# Patient Record
Sex: MALE | Race: WHITE | URBAN - METROPOLITAN AREA
[De-identification: names, ages, dates, MRNs, and addresses within clinical notes are randomized per-mention and may not be internally consistent; named-entity substitution may affect disease eponyms.]

---

## 2020-06-09 ENCOUNTER — OFFICE VISIT (OUTPATIENT)
Dept: ORTHOPEDIC SURGERY | Age: 20
End: 2020-06-09
Payer: COMMERCIAL

## 2020-06-09 VITALS — HEIGHT: 74 IN | WEIGHT: 200 LBS | BODY MASS INDEX: 25.67 KG/M2

## 2020-06-09 PROCEDURE — 99203 OFFICE O/P NEW LOW 30 MIN: CPT | Performed by: PHYSICIAN ASSISTANT

## 2020-06-11 NOTE — PROGRESS NOTES
Chief Complaint  Shoulder Injury (LEFT deltoid tuberosity pain after fall holding dog leash; hit posterior aspect of shoulder with arm in flexion; pain w/movements above 90)      History of Present Illness:  Ida Lozano is a 20 y.o. male who presents today for evaluation of left shoulder pain. Patient states that a few weeks ago he was walking his dog when he slipped and a water total falling backwards in the dog lunged forward creating a piston effect to his left arm. He states he had immediate pain but states that with resting over-the-counter medication and gradually improved. He states that he is recently started a new job working for a [employer] and has to do a lot of physical activity to include lifting canoes up over his head. He finds that this activity increases his pain. Patient is very active and does exercise on a regular basis and finds that abduction and overhead activity exacerbates his pain activities where he keeps his arm close to his body seems to reduce his pain. He does at times have mild weakness. He has taken occasional over-the-counter ibuprofen with some benefit. He's had no previous injuries to the left shoulder. Pain is mainly over the lateral aspect of the shoulder with range of motion patient is right-hand dominant. Medical History  Patient's medications, allergies, past medical, surgical, social and family histories were reviewed and updated as appropriate. Review of Systems  Relevant review of systems reviewed and available in the patient's chart    Vital Signs  There were no vitals filed for this visit. General Exam:   Constitutional: Patient is adequately groomed with no evidence of malnutrition  Mental Status: The patient is oriented to time, place and person. The patient's mood and affect are appropriate. Neurological: The patient has good coordination. There is no weakness or sensory deficit.     Shoulder Examination  Inspection:  There is no deformity,

## 2020-06-23 ENCOUNTER — OFFICE VISIT (OUTPATIENT)
Dept: ORTHOPEDIC SURGERY | Age: 20
End: 2020-06-23
Payer: COMMERCIAL

## 2020-06-23 VITALS — BODY MASS INDEX: 25.67 KG/M2 | WEIGHT: 200 LBS | HEIGHT: 74 IN

## 2020-06-23 PROCEDURE — 99203 OFFICE O/P NEW LOW 30 MIN: CPT | Performed by: ORTHOPAEDIC SURGERY

## 2020-06-23 RX ORDER — IBUPROFEN 200 MG
200 TABLET ORAL EVERY 6 HOURS PRN
COMMUNITY

## 2020-06-23 NOTE — PROGRESS NOTES
palpable and 2+. Neurological: The patient has good coordination. There is no weakness or sensory deficit. Shoulder Examination:    Inspection: On inspection he has no obvious atrophy or superficial changes, reasonably good scapular control    Palpation: He has no real focal tenderness perhaps very slightly along the anterior joint line    Range of Motion: Full glenohumeral arc of movement    Strength: Good isometric rotator cuff strength. Special Tests: He does have a very reproducible pain with Jobes, O'Briens and Whipple's maneuver. No glenohumeral instability    Skin: There are no rashes, ulcerations or lesions. Gait: No instability    Spine good cervical rotation    Additional Comments:         Radiology:     X-rays from after Hours demonstrate normal osseous alignment of the glenohumeral articulation      Assessment : Clinical concern for a superior labral tear      Office Procedures:  Orders Placed This Encounter   Procedures    Ambulatory referral to Physical Therapy     Referral Priority:   Routine     Referral Type:   Eval and Treat     Referral Reason:   Specialty Services Required     Number of Visits Requested:   1       Treatment Plan: We discussed this provisional diagnosis. First-line recommendation for conservative treatment. He is using as needed anti-inflammatories. I like him to have a few visits of therapy to learn a glenohumeral stabilization program and scapular strengthening. Katy Cevallos observe this over the next 5 to 6 weeks. If not improving he is can call to arrange an [address] [facility]     This dictation was performed with a verbal recognition program (DRAGON) and it was checked for errors. It is possible that there are still dictated errors within this office note. If so, please bring any errors to my attention for an addendum.  All efforts were made to ensure that this office note is

## 2020-06-25 ENCOUNTER — HOSPITAL ENCOUNTER (OUTPATIENT)
Dept: PHYSICAL THERAPY | Age: 20
Setting detail: THERAPIES SERIES
Discharge: HOME | End: 2020-06-25
Payer: COMMERCIAL

## 2020-06-25 PROCEDURE — 97161 PT EVAL LOW COMPLEX 20 MIN: CPT | Performed by: PHYSICAL THERAPIST

## 2020-06-25 PROCEDURE — 97140 MANUAL THERAPY 1/> REGIONS: CPT | Performed by: PHYSICAL THERAPIST

## 2020-06-25 PROCEDURE — 97110 THERAPEUTIC EXERCISES: CPT | Performed by: PHYSICAL THERAPIST

## 2020-06-25 NOTE — PLAN OF CARE
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, attends     Living Status/Prior Level of Function: Independent with ADLs and IADLs, running daily, workout at gym/lifting, hiking, golf      OBJECTIVE:      Initial Initial Current   VAS 7/10     QDash 34%     ROM Left Right    Shoulder Flex 126 166    Shoulder Abd 109 165    Shoulder ER T 4 T 5    Shoulder IR T 11 T 5    Strength  Left Right    Shoulder Flex 4 5    Shoulder Scap 4 5    Shoulder ER 4+ 5    Shoulder IR 5* 5    THORACIC ROM      flexion WNL WNL    ext WNL WNL R/L   Rotation R/L WNL WNL R/L   SB R/L WNL WNL      CERV ROM Left Right    Cervical Flexion WNL WNL    Cervical Extension WNL WNL    Cervical SB R/L WNL WNL R/L   Cervical rotation R/L WNL WNL R/L     Reflexes/Sensation:    [x]Dermatomes/Myotomes intact    [x]Reflexes equal and normal bilaterally   []Other:    Joint mobility:    []Normal    [x]Hypo   []Hyper    Palpation:L UT, ant deltoid, Pec minor,     Functional Mobility/Transfers: independent    Posture: thoracic kyphosis    Bandages/Dressings/Incisions: none    Gait: (include devices/WB status) WNL    Orthopedic Special Tests:   Neer's (+)   Hawkin's Jeet (-)   Sulcus Sign (+)   Load and Shift (+)   Anterior Apprehension/Relocation    Dynamic Labral Shear    Empty Can (+)   Lift Off (+)   Belly Press (+)                        [x] Patient history, allergies, meds reviewed. Medical chart reviewed. See intake form. Review Of Systems (ROS):  [x]Performed Review of systems (Integumentary, CardioPulmonary, Neurological) by intake and observation. Intake form has been scanned into medical record.  Patient has been instructed to contact their primary care physician regarding ROS issues if not already being addressed at this time. Co-morbidities/Complexities (which will affect course of rehabilitation):   [x]None           Arthritic conditions   []Rheumatoid arthritis (M05.9)  []Osteoarthritis (M19.91)   Cardiovascular conditions   []Hypertension (I10)  []Hyperlipidemia (E78.5)  []Angina pectoris (I20)  []Atherosclerosis (I70)   Musculoskeletal conditions   []Disc pathology   []Congenital spine pathologies   []Prior surgical intervention  []Osteoporosis (M81.8)  []Osteopenia (M85.8)   Endocrine conditions   []Hypothyroid (E03.9)  []Hyperthyroid Gastrointestinal conditions   []Constipation (L19.68)   Metabolic conditions   []Morbid obesity (E66.01)  []Diabetes type 1(E10.65) or 2 (E11.65)   []Neuropathy (G60.9)     Pulmonary conditions   []Asthma (J45)  []Coughing   []COPD (J44.9)   Psychological Disorders  []Anxiety (F41.9)  []Depression (F32.9)   []Other:   []Other:          Barriers to/and or personal factors that will affect rehab potential:              []Age  []Sex              []Motivation/Lack of Motivation                        []Co-Morbidities              []Cognitive Function, education/learning barriers              []Environmental, home barriers              []profession/work barriers  []past PT/medical experience  []other:  Justification: Pt. Without co-morbidities and good tolerance to initial treatment    Falls Risk Assessment (30 days):   [x] Falls Risk assessed and no intervention required. [] Falls Risk assessed and Patient requires intervention due to being higher risk   TUG score (>12s at risk):     [] Falls education provided.       G-Codes:       ASSESSMENT:    Functional Impairments   [x]Noted spinal or UE joint hypomobility   []Noted spinal or UE joint hypermobility   [x]Decreased UE functional ROM   [x]Decreased UE functional strength   []Abnormal reflexes/sensation/myotomal/dermatomal deficits   [x]Decreased RC/scapular/core strength and neuromuscular control   []other:      Functional Activity Limitations (from functional questionnaire and intake)   []Reduced ability to tolerate prolonged functional positions   []Reduced ability or difficulty with changes of positions or transfers between positions   [x]Reduced ability to maintain good posture and demonstrate good body mechanics with sitting, bending, and lifting   [] Reduced ability or tolerance with driving and/or computer work   []Reduced ability to sleep   [x]Reduced ability to perform lifting, reaching, carrying tasks   [x]Reduced ability to tolerate impact through UE   [x]Reduced ability to reach behind back   []Reduced ability to  or hold objects   [x]Reduced ability to throw or toss an object   []other:    Participation Restrictions   []Reduced participation in self care activities   []Reduced participation in home management activities   [x]Reduced participation in work activities   []Reduced participation in social activities. [x]Reduced participation in sport/recreation activities. Classification :   []signs/symptoms consistent with post-surgical status including decreased ROM, strength and function.   []Signs/symptoms consistent with joint sprain/strain   [x]Signs/symptoms consistent with shoulder impingement   []Signs/symptoms consistent with shoulder/elbow tendinopathy   []Signs/symptoms consistent with Rotator cuff tear   []Signs/symptoms consistent with labral tear   []Signs/symptoms consistent with postural dysfunction    []signs/symptoms consistent with Glenohumeral Internal Rotation Deficit - <45 degrees   []signs/symptoms consistent with facet dysfunction of cervical/cervico-thoracic or thoracicspine    []signs/symptoms consistent with pathology which may benefit from Dry needling     [x]other:  L shoulder instability    Prognosis/Rehab Potential:      []Excellent   [x]Good    [x]Fair   []Poor    Tolerance of evaluation/treatment: []Excellent   [x]Good    []Fair   []Poor    Physical Therapy Evaluation Complexity Justification  [x] A history of present problem with:  [] no personal factors and/or comorbidities that impact the plan of care;  [x]1-2 personal factors and/or comorbidities that impact the plan of care  []3 personal factors and/or comorbidities that impact the plan of care  [x] An examination of body systems using standardized tests and measures addressing any of the following: body structures and functions (impairments), activity limitations, and/or participation restrictions;:  [] a total of 1-2 or more elements   [x] a total of 3 or more elements   [] a total of 4 or more elements   [x] A clinical presentation with:  [x] stable and/or uncomplicated characteristics   [] evolving clinical presentation with changing characteristics  [] unstable and unpredictable characteristics;   [x] Clinical decision making of [x] low, [] moderate, [] high complexity using standardized patient assessment instrument and/or measurable assessment of functional outcome. [x] EVAL (LOW) 65075 (typically 20 minutes face-to-face)  [] EVAL (MOD) 37969 (typically 30 minutes face-to-face)  [] EVAL (HIGH) 00609 (typically 45 minutes face-to-face)  [] RE-EVAL     PLAN:  Frequency/Duration:  2 days per week for 8 Weeks:  INTERVENTIONS:  [x] Therapeutic exercise including: strength training, ROM, for Upper extremity and core   [x]  NMR activation and proprioception for UE, scap and Core   [x] Manual therapy as indicated for shoulder, scapula and spine to include: Dry Needling/IASTM, STM, PROM, Gr I-IV mobilizations, manipulation. [x] Modalities as needed that may include: thermal agents, E-stim, Biofeedback, US, iontophoresis as indicated  [x] Patient education on joint protection, postural re-education, activity modification, progression of HEP. HEP instruction: Discussed HEP and patient given handout.     GOALS:  Patient stated goal: restore ROM and

## 2020-06-25 NOTE — FLOWSHEET NOTE
NMR re-education                                                 Therapeutic Exercise and NMR EXR  [x] (06587) Provided verbal/tactile cueing for activities related to strengthening, flexibility, endurance, ROM  for improvements in scapular, scapulothoracic and UE control with self care, reaching, carrying, lifting, house/yardwork, driving/computer work. [x] (37945) Provided verbal/tactile cueing for activities related to improving balance, coordination, kinesthetic sense, posture, motor skill, proprioception  to assist with  scapular, scapulothoracic and UE control with self care, reaching, carrying, lifting, house/yardwork, driving/computer work. Therapeutic Activities:    [] (26589 or 51706) Provided verbal/tactile cueing for activities related to improving balance, coordination, kinesthetic sense, posture, motor skill, proprioception and motor activation to allow for proper function of scapular, scapulothoracic and UE control with self care, carrying, lifting, driving/computer work.      Home Exercise Program:    [x] (22981) Reviewed/Progressed HEP activities related to strengthening, flexibility, endurance, ROM of scapular, scapulothoracic and UE control with self care, reaching, carrying, lifting, house/yardwork, driving/computer work  [] (25781) Reviewed/Progressed HEP activities related to improving balance, coordination, kinesthetic sense, posture, motor skill, proprioception of scapular, scapulothoracic and UE control with self care, reaching, carrying, lifting, house/yardwork, driving/computer work      Manual Treatments:  PROM / STM / Oscillations-Mobs:  G-I, II, III, IV (PA's, Inf., Post.)  [x] (92138) Provided manual therapy to mobilize soft tissue/joints of cervical/CT, scapular GHJ and UE for the purpose of modulating pain, promoting relaxation,  increasing ROM, reducing/eliminating soft tissue swelling/inflammation/restriction, improving soft tissue extensibility and allowing for proper ROM

## 2020-07-02 ENCOUNTER — HOSPITAL ENCOUNTER (OUTPATIENT)
Dept: PHYSICAL THERAPY | Age: 20
Setting detail: THERAPIES SERIES
Discharge: HOME | End: 2020-07-02
Payer: COMMERCIAL

## 2020-07-02 NOTE — FLOWSHEET NOTE
[facility], [address]        Physical Therapy  Cancellation/No-show Note  Patient Name:  Schuyler Bran  :  2000   Date:  2020  Cancelled visits to date: 1  No-shows to date: 0    For today's appointment patient:  [x]  Cancelled  []  Rescheduled appointment  []  No-show     Reason given by patient:  []  Patient ill  []  Conflicting appointment  []  No transportation    []  Conflict with work  []  No reason given  [x]  Other: awaiting ins.  approval    Comments:      Electronically signed by:  Lisbet Herrera, PT, MSPT, OMT-C [id number]

## 2020-07-07 ENCOUNTER — HOSPITAL ENCOUNTER (OUTPATIENT)
Dept: PHYSICAL THERAPY | Age: 20
Setting detail: THERAPIES SERIES
Discharge: HOME | End: 2020-07-07
Payer: COMMERCIAL

## 2020-07-16 ENCOUNTER — HOSPITAL ENCOUNTER (OUTPATIENT)
Dept: PHYSICAL THERAPY | Age: 20
Setting detail: THERAPIES SERIES
Discharge: HOME | End: 2020-07-16
Payer: COMMERCIAL

## 2020-07-21 ENCOUNTER — HOSPITAL ENCOUNTER (OUTPATIENT)
Dept: PHYSICAL THERAPY | Age: 20
Setting detail: THERAPIES SERIES
Discharge: HOME | End: 2020-07-21
Payer: COMMERCIAL

## 2020-07-21 NOTE — FLOWSHEET NOTE
[facility], [address]        Physical Therapy  Cancellation/No-show Note  Patient Name:  Delmy Santo  :  2000   Date:  2020  Cancelled visits to date: 1  No-shows to date: 3    For today's appointment patient:  []  Cancelled  []  Rescheduled appointment  [x]  No-show     Reason given by patient:  []  Patient ill  []  Conflicting appointment  []  No transportation    []  Conflict with work  [x]  No reason given  []  Other: awaiting ins.  approval    Comments:      Electronically signed by:  Flores Love, PT, MSPT, OMT-C [id number]

## 2020-08-25 ENCOUNTER — HOSPITAL ENCOUNTER (OUTPATIENT)
Dept: PHYSICAL THERAPY | Age: 20
Setting detail: THERAPIES SERIES
Discharge: HOME | End: 2020-08-25
Payer: COMMERCIAL

## 2020-08-25 NOTE — FLOWSHEET NOTE
[facility], [address]        Physical Therapy  Cancellation/No-show Note  Patient Name:  Shun Matos  :  2000   Date:  2020  Cancelled visits to date: 2  No-shows to date: 3    For today's appointment patient:  [x]  Cancelled  []  Rescheduled appointment  []  No-show     Reason given by patient:  []  Patient ill  []  Conflicting appointment  []  No transportation    []  Conflict with work  [x]  No reason given  []  Other: awaiting ins.  approval    Comments:      Electronically signed by:  Melissa Ash, PT, MSPT, OMT-C [id number]

## 2020-08-27 ENCOUNTER — HOSPITAL ENCOUNTER (OUTPATIENT)
Dept: PHYSICAL THERAPY | Age: 20
Setting detail: THERAPIES SERIES
Discharge: HOME | End: 2020-08-27
Payer: COMMERCIAL

## 2020-08-27 PROCEDURE — 97110 THERAPEUTIC EXERCISES: CPT | Performed by: PHYSICAL THERAPIST

## 2020-08-27 PROCEDURE — 97164 PT RE-EVAL EST PLAN CARE: CPT | Performed by: PHYSICAL THERAPIST

## 2020-08-27 PROCEDURE — 97112 NEUROMUSCULAR REEDUCATION: CPT | Performed by: PHYSICAL THERAPIST

## 2020-08-27 NOTE — PLAN OF CARE
[facility], [address]  Phone: [phone]  Fax [phone]    Physical Therapy Daily Treatment Note  Date:  2020    Patient Name:  Lis Herrera    :  2000  MRN: [id number]  Restrictions/Precautions:    Physician Information:  Referring Practitioner: Zeinab Zeng MD  Medical/Treatment Diagnosis Information:  · Diagnosis: M75.42 (ICD-10-CM) - Impingement syndrome of left shoulder, S46.912A (ICD-10-CM) - Shoulder strain, left, initial encounter  Treatment Diagnosis:Left shoulder pain M25.512  ·   [x] Conservative / [] Surgical - DOS:  Insurance/Certification information:  PT Insurance Information: Lukaszmarilumariaelena Shay new insurance as of August  Number of Comorbidities:  [x]0     []1-2    []3+          Latex Allergy:  [x]NO      []YES  Preferred Language for Healthcare:   [x]English       []other:  Has the plan of care been signed (Y/N):        []  Yes  [x]  No       Is this a Progress Report:     [x]  Yes  []  No      If Yes:  Date Range for reporting period:  Beginning 2020  Ending 20    Progress report will be due (10 Rx or 30 days whichever is less):        Recertification will be due (POC Duration  / 90 days whichever is less): 8 weeks       Visit # Insurance Allowable Auth Required   2 ? []  Yes []  No        SUBJECTIVE:  Pt. Returns after initial eval on 20 without follow up for re-evaluation of L shoulder, States that his father lost his job and he was without insurance until now, has switched to [employer]. He will need to limit PT visits due to financial limitations. The arm seems ok at rest for the most part, reaching or lifting overhead will provoke the pain, reaching behind is the worst, reaching out to the side is also limited. The pain is in the upper lateral arm (deltoid).     OBJECTIVE:     Initial Initial Current   VAS 7/10   3/10    QDash 34%   25%    ROM Left Right     Shoulder Flex 126 166 150    Shoulder Abd 109 165  115   Shoulder ER T 4 T 5  T6   Shoulder IR T 11 T 5  T9   Strength  Left Right     Shoulder Flex 4 5  4+   Shoulder Scap 4 5 4+    Shoulder ER 4+ 5  5-   Shoulder IR 5* 5 5 -           Observation:(+)NEER's, (+)speed's (-)hahn-Jeet, (+)Yerguson's, (+)belly press, (+)lift off (+)sulcus sign (-)load and shift   Palpation: lat deltoid, LHB tendon, IS, Pec minor       RESTRICTIONS/PRECAUTIONS:     Exercises/Interventions:   Therapeutic Ex Sets/reps Notes   tableslide flexion, ladder, 45's x10  ea With focus on scap movement   SBS Reviewed correct form    UT/LEV stretch          TB rows, ext L X 10 ea Blue band   TB ER IR (with towel roll) 2x10 ea  mod cues for scapular position Blue band   Corner stretch HEP Per doc   Pt ed:  anatomy, PT progression, RICE 8 min              Supine ceiling punch Reviewed for HEP Add weight as able for HEP +supine approximation   Prone scap retraction     SL scap retraction with ER               scap clocks at wall with TB x10 OVL   Ball on wall with u/d,s/s,cw/ccw x10 ea Cues for scap position             Manual Intervention     Gr I-II post/inf GHJ mobs     PROM L shoulder          Ed for CFM pec mnor/IS 3'    GHJ gr I-II oscillations 3'              NMR re-education                                                 Therapeutic Exercise and NMR EXR  [x] (35818) Provided verbal/tactile cueing for activities related to strengthening, flexibility, endurance, ROM  for improvements in scapular, scapulothoracic and UE control with self care, reaching, carrying, lifting, house/yardwork, driving/computer work. [x] (34568) Provided verbal/tactile cueing for activities related to improving balance, coordination, kinesthetic sense, posture, motor skill, proprioception  to assist with  scapular, scapulothoracic and UE control with self care, reaching, carrying, lifting, house/yardwork, driving/computer work.     Therapeutic Activities:    [] (05265 or 12801) Provided verbal/tactile cueing for activities related to improving balance, coordination, kinesthetic sense, posture, motor skill, proprioception and motor activation to allow for proper function of scapular, scapulothoracic and UE control with self care, carrying, lifting, driving/computer work.      Home Exercise Program:    [x] (78534) Reviewed/Progressed HEP activities related to strengthening, flexibility, endurance, ROM of scapular, scapulothoracic and UE control with self care, reaching, carrying, lifting, house/yardwork, driving/computer work  [] (87689) Reviewed/Progressed HEP activities related to improving balance, coordination, kinesthetic sense, posture, motor skill, proprioception of scapular, scapulothoracic and UE control with self care, reaching, carrying, lifting, house/yardwork, driving/computer work      Manual Treatments:  PROM / STM / Oscillations-Mobs:  G-I, II, III, IV (PA's, Inf., Post.)  [x] (29789) Provided manual therapy to mobilize soft tissue/joints of cervical/CT, scapular GHJ and UE for the purpose of modulating pain, promoting relaxation,  increasing ROM, reducing/eliminating soft tissue swelling/inflammation/restriction, improving soft tissue extensibility and allowing for proper ROM for normal function with self care, reaching, carrying, lifting, house/yardwork, driving/computer work    Modalities:      [] GR/ESU 15 min    [] GR 15 min   [] ESU     [] CP    [] MHP    [] declined    Charges:  Timed Code Treatment Minutes: 28   Total Treatment Minutes: 48     [] EVAL (LOW) 05127 (typically 20 minutes face-to-face)  [] EVAL (MOD) 48224 (typically 30 minutes face-to-face)  [] EVAL (HIGH) 88105 (typically 45 minutes face-to-face)  [x] RE-EVAL     [x] UA(16436) x  1   [] IONTO  [x] NMR (99875) x 1    [] VASO  [] Manual (97937) x     [] Other:  [] TA x      [] Mech Traction (28609)  [] ES(attended) (48752)      [] ES (un) (71400):     GOALS:  Patient stated goal: restore ROM and strength L shoulder  [x] Progressing: [] Met: [] Not Met: [] Adjusted    Therapist goals for Patient:   Short Term Goals: To be achieved in: 2 weeks  1. Independent in HEP and progression per patient tolerance, in order to prevent re-injury. [x] Progressing: [] Met: [] Not Met: [] Adjusted  2. Patient will have a decrease in pain to facilitate improvement in movement, function, and ADLs as indicated by Functional Deficits. [x] Progressing: [] Met: [] Not Met: [] Adjusted      Long Term Goals: To be achieved in: 8 weeks  1. Disability index score of 17% or less for the Quickdash  to assist with reaching prior level of function. [x] Progressing: [] Met: [] Not Met: [] Adjusted  2. Patient will demonstrate increased AROM to WNL to allow for proper joint functioning as indicated by patients Functional Deficits. [x] Progressing: [] Met: [] Not Met: [] Adjusted  3. Patient will demonstrate an increase in Strength >/=5/5 to allow for proper functional mobility as indicated by patients Functional Deficits. [x] Progressing: [] Met: [] Not Met: [] Adjusted  4. Patient will return to reaching and lifting functional activities without increased symptoms or restriction. [] Progressing: [] Met: [x] Not Met: [] Adjusted  5. Pt to be able to return to work with lifting(patient specific functional goal)    [] Progressing: [] Met: [x] Not Met: [] Adjusted  6. Pt. To demonstrate AROM L shoulder with appropriate scapulo-humeral rhythm    [] Progressing: [] Met: [] Not Met: [] Adjusted    Overall Progression Towards Functional goals/ Treatment Progress Update:  [] Patient is progressing as expected towards functional goals listed. [x] Progression is slowed due to complexities/Impairments listed. [] Progression has been slowed due to co-morbidities.   [] Plan just implemented, too soon to assess goals progression <30days   [] Goals require adjustment due to lack of progress  [] Patient is not progressing as expected and requires additional follow up with physician  [] Other    Prognosis for POC: [x] Good [] Fair  [] Poor      Patient requires continued skilled intervention: [x] Yes  [] No      ASSESSMENT:  Pt. Returns for follow up from 6/25/20 eval with improving L shoulder ROM and strength, as well as improved disability score. He is still limited by pain with reaching and lifting, especially behind. He has significant scapular winging at rest and with ex. He did a good job of correcting scap stab and avoiding UT firing with cues. Also having some TrP pain in L pec minor and IS. Pt. Would benefit from skilled PT to progress through scap stab and functional ex. However, has a financial limitation and can come to PT on a limited basis. Treatment/Activity Tolerance:  [x] Patient tolerated treatment well [] Patient limited by fatique  [] Patient limited by pain  [] Patient limited by other medical complications  [] Other:     Prognosis: [x] Good [] Fair  [] Poor    Patient Requires Follow-up: [x] Yes  [] No    PLAN: Rec. PT 2x/week 8 weeks, however pt. Unable and will call to schedule next appt. In 2-3 weeks  [x] Continue per plan of care [] Alter current plan (see comments above)  [] Plan of care initiated [] Hold pending MD visit [] Discharge      Electronically signed by:  Scottie Ramirez, PT, MSPT, OMT-C [id number]    Note: If patient does not return for scheduled/ recommended follow up visits, this note will serve as a discharge from care along with most recent update on progress.

## 2020-10-20 ENCOUNTER — HOSPITAL ENCOUNTER (OUTPATIENT)
Dept: PHYSICAL THERAPY | Age: 20
Setting detail: THERAPIES SERIES
Discharge: HOME | End: 2020-10-20
Payer: COMMERCIAL

## 2020-10-20 PROCEDURE — 97112 NEUROMUSCULAR REEDUCATION: CPT | Performed by: PHYSICAL THERAPIST

## 2020-10-20 PROCEDURE — 97110 THERAPEUTIC EXERCISES: CPT | Performed by: PHYSICAL THERAPIST

## 2020-10-20 NOTE — PLAN OF CARE
[facility], [address] [place]  Phone: [phone]  Fax [phone]    Physical Therapy Daily Treatment Note  Date:  10/20/2020    Patient Name:  Korey Castanon    :  2000  MRN: [id number]  Restrictions/Precautions:    Physician Information:  Referring Practitioner: Makeda Champion MD  Medical/Treatment Diagnosis Information:  · Diagnosis: M75.42 (ICD-10-CM) - Impingement syndrome of left shoulder, S46.912A (ICD-10-CM) - Shoulder strain, left, initial encounter  Treatment Diagnosis:Left shoulder pain M25.512  ·   [x] Conservative / [] Surgical - DOS:  Insurance/Certification information:  PT Insurance Information: Michael Calloway new insurance as of August  Number of Comorbidities:  [x]0     []1-2    []3+          Latex Allergy:  [x]NO      []YES  Preferred Language for Healthcare:   [x]English       []other:  Has the plan of care been signed (Y/N):        []  Yes  [x]  No       Is this a Progress Report:     [x]  Yes  []  No      If Yes:  Date Range for reporting period:  Beginning  20  Ending 10/20/20    Progress report will be due (10 Rx or 30 days whichever is less): 15/26/90       Recertification will be due (POC Duration  / 90 days whichever is less):4 weeks       Visit # Insurance Allowable Auth Required   3 ? []  Yes []  No        SUBJECTIVE:  Pt. Returns after last visit 2 mos. Ago for update to program, as he is doing much better and needs progression.     OBJECTIVE:     Initial Initial Current   VAS 7/10   0-1/10    QDash 34%   9%    ROM Left Right     Shoulder Flex 126 166 168   Shoulder Abd 109 165 165   Shoulder ER T 4 T 5  T6   Shoulder IR T 11 T 5  T5   Strength  Left Right     Shoulder Flex 4 5 5   Shoulder Scap 4 5 5   Shoulder ER 4+ 5 5   Shoulder IR 5* 5 5           Observation:(-)NEER's, (-)speed's (-)hahn-Jeet, (-)belly press, (-)lift off (-)sulcus sign (-)load and shift   Palpation: RESTRICTIONS/PRECAUTIONS:     Exercises/Interventions:   Therapeutic Ex Sets/reps Notes   tableslide flexion, ladder, 45's  With focus on scap movement   SBS     UT/LEV stretch          TB rows, ext L  Blue band   TB ER IR (with towel roll)   mod cues for scapular position Blue band   Corner stretch HEP Per doc   Pt ed:   PT progression, 8 min              Supine ceiling punch  Add weight as able for HEP +supine approximation   Prone scap retraction     SL scap retraction with ER               scap clocks at wall with TB  OVL   Ball on wall with u/d,s/s,cw/ccw  Cues for scap position             Manual Intervention     Assessment of L shoulder 5'    Gr I-II post/inf GHJ mobs     PROM L shoulder          Ed for CFM pec mnor/IS     GHJ gr I-II oscillations               NMR re-education     Door way stretch :10x10    Supine approximation :60x2 ea ball up/ball down 2 kg db   Supine SA to lat arm bar x20 2 kg db        Prone plank :30x1 Cues for protraction   Table plank Ed for modification    Wall push up vs SB wall push up x5 ea Cues for SA press at end        CC row/ext/LPD/ER/IR x10 ea 10pl/8pl/8pl/3pl/3pl                           Therapeutic Exercise and NMR EXR  [x] (60221) Provided verbal/tactile cueing for activities related to strengthening, flexibility, endurance, ROM  for improvements in scapular, scapulothoracic and UE control with self care, reaching, carrying, lifting, house/yardwork, driving/computer work. [x] (90012) Provided verbal/tactile cueing for activities related to improving balance, coordination, kinesthetic sense, posture, motor skill, proprioception  to assist with  scapular, scapulothoracic and UE control with self care, reaching, carrying, lifting, house/yardwork, driving/computer work.     Therapeutic Activities:    [] (62658 or 74296) Provided verbal/tactile cueing for activities related to improving balance, coordination, kinesthetic sense, posture, motor skill, proprioception and motor activation to allow for proper function of scapular, scapulothoracic and UE control with self care, carrying, lifting, driving/computer work. Home Exercise Program:    [x] (58882) Reviewed/Progressed HEP activities related to strengthening, flexibility, endurance, ROM of scapular, scapulothoracic and UE control with self care, reaching, carrying, lifting, house/yardwork, driving/computer work  [] (50695) Reviewed/Progressed HEP activities related to improving balance, coordination, kinesthetic sense, posture, motor skill, proprioception of scapular, scapulothoracic and UE control with self care, reaching, carrying, lifting, house/yardwork, driving/computer work      Manual Treatments:  PROM / STM / Oscillations-Mobs:  G-I, II, III, IV (PA's, Inf., Post.)  [x] (26600) Provided manual therapy to mobilize soft tissue/joints of cervical/CT, scapular GHJ and UE for the purpose of modulating pain, promoting relaxation,  increasing ROM, reducing/eliminating soft tissue swelling/inflammation/restriction, improving soft tissue extensibility and allowing for proper ROM for normal function with self care, reaching, carrying, lifting, house/yardwork, driving/computer work    Modalities:      [] GR/ESU 15 min    [] GR 15 min   [] ESU     [] CP    [] MHP    [] declined    Charges:  Timed Code Treatment Minutes: 45   Total Treatment Minutes: 45     [] EVAL (LOW) 96809 (typically 20 minutes face-to-face)  [] EVAL (MOD) 77570 (typically 30 minutes face-to-face)  [] EVAL (HIGH) 68850 (typically 45 minutes face-to-face)  [] RE-EVAL     [x] ZY(62772) x  2   [] IONTO  [x] NMR (91669) x 1    [] VASO  [] Manual (63540) x     [] Other:  [] TA x      [] Mech Traction (45542)  [] ES(attended) (63983)      [] ES (un) (08192):     GOALS:  Patient stated goal: restore ROM and strength L shoulder  [] Progressing: [x] Met: [] Not Met: [] Adjusted    Therapist goals for Patient:   Short Term Goals: To be achieved in: 2 weeks  1. Independent in HEP and progression per patient tolerance, in order to prevent re-injury. [] Progressing: [x] Met: [] Not Met: [] Adjusted  2. Patient will have a decrease in pain to facilitate improvement in movement, function, and ADLs as indicated by Functional Deficits. [] Progressing: [x] Met: [] Not Met: [] Adjusted      Long Term Goals: To be achieved in: 8 weeks  1. Disability index score of 17% or less for the Quickdash  to assist with reaching prior level of function. [] Progressing: [x] Met: [] Not Met: [] Adjusted  2. Patient will demonstrate increased AROM to WNL to allow for proper joint functioning as indicated by patients Functional Deficits. [] Progressing: [x] Met: [] Not Met: [] Adjusted  3. Patient will demonstrate an increase in Strength >/=5/5 to allow for proper functional mobility as indicated by patients Functional Deficits. [] Progressing: [x] Met: [] Not Met: [] Adjusted  4. Patient will return to reaching and lifting functional activities without increased symptoms or restriction. [x] Progressing:has still been limiting activity [] Met: [] Not Met: [] Adjusted  5. Pt to be able to return to work with lifting(patient specific functional goal)    [x] Progressing: [] Met: [] Not Met: [] Adjusted  6. Pt. To demonstrate AROM L shoulder with appropriate scapulo-humeral rhythm    [x] Progressing: [] Met: [] Not Met: [] Adjusted    Overall Progression Towards Functional goals/ Treatment Progress Update:  [x] Patient is progressing as expected towards functional goals listed. [] Progression is slowed due to complexities/Impairments listed. [] Progression has been slowed due to co-morbidities.   [] Plan just implemented, too soon to assess goals progression <30days   [] Goals require adjustment due to lack of progress  [] Patient is not progressing as expected and requires additional follow up with physician  [] Other    Prognosis for POC: [x] Good [] Fair  [] Poor      Patient

## 2020-11-05 ENCOUNTER — HOSPITAL ENCOUNTER (OUTPATIENT)
Dept: PHYSICAL THERAPY | Age: 20
Setting detail: THERAPIES SERIES
Discharge: HOME | End: 2020-11-05
Payer: COMMERCIAL

## 2020-11-05 PROCEDURE — 97112 NEUROMUSCULAR REEDUCATION: CPT | Performed by: PHYSICAL THERAPIST

## 2020-11-05 PROCEDURE — 97110 THERAPEUTIC EXERCISES: CPT | Performed by: PHYSICAL THERAPIST

## 2020-11-05 NOTE — DISCHARGE SUMMARY
[facility],  [address]  Phone: [phone]  Fax [phone]       Physical Therapy Discharge  Date: 2020        Patient Name:  London Osgood    :  2000  MRN: [id number]  Referring Samuel Love MD  · Diagnosis:M75.42 (ICD-10-CM) - Impingement syndrome of left shoulder, T02.731G (ICD-10-CM) - Shoulder strain, left, initial encounter  Treatment Diagnosis:Left shoulder pain M25.512                    [] Surgical [x] Conservative   Therapy Diagnosis/Practice Pattern:C      Number of Comorbidities:  [x]0     []1-2    []3+  Total number of visits: 4   Reporting Period:   Beginning Date:20   End Date:20    Initial Initial Current   VAS 7/10   0/10    QDash 34%   9%    ROM Left Right     Shoulder Flex 126 166 168   Shoulder Abd 109 165 165   Shoulder ER T 4 T 5  T6   Shoulder IR T 11 T 5  T5   Strength  Left Right     Shoulder Flex 4 5 5   Shoulder Scap 4 5 5   Shoulder ER 4+ 5 5   Shoulder IR 5* 5 5          Treatment to date:  [x] Therapeutic Exercise    [] Modalities:  [] Therapeutic Activity             []Ultrasound            []Electrical Stimulation  [] Gait Training     []Cervical Traction    [] Lumbar Traction  [x] Neuromuscular Re-education [] Cold/hotpack         []Iontophoresis  [] Instruction in HEP      Other:  [x] Manual Therapy                   []                                  []   Assessment:   [x] All Goals were achieved.    [] The following goals were achieved (#'s):   [] The following goals were not achieved for the following reasons:/assessmen of improvement as it relates to each goal:    Plan of Care:  [x] Discharge from Therapy Services due to:    Reason for Discharge:   [x] All goals achieved    [] Patient having surgery  [] Physician discontinued therapy  [] Insurance/Financial Limitations [] Patient did not return for follow up visits [] Home program/1 visit only   [] No subjective or objective improvement [] Plateaued   [] Patient was unable to adhere to the plan of care established at evaluation. [] Referred back to physician for re-evaluation and did not return.      [] Other:       Electronically signed by:  Stone Garcia, PT, MSPT , OMT_C [id number]

## 2020-11-05 NOTE — PLAN OF CARE
[facility], [address]  Phone: [phone]  Fax [phone]    Physical Therapy Daily Treatment Note  Date:  2020    Patient Name:  Lizet Story    :  2000  MRN: [id number]  Restrictions/Precautions:    Physician Information:  Referring Practitioner: Santana Correia MD  Medical/Treatment Diagnosis Information:  · Diagnosis: M75.42 (ICD-10-CM) - Impingement syndrome of left shoulder, S46.912A (ICD-10-CM) - Shoulder strain, left, initial encounter  Treatment Diagnosis:Left shoulder pain M25.512  ·   [x] Conservative / [] Surgical - DOS:  Insurance/Certification information:  PT Insurance Information: Farhana Polancojose manuelirais Adriansamy [employer] new insurance as of August  Number of Comorbidities:  [x]0     []1-2    []3+          Latex Allergy:  [x]NO      []YES  Preferred Language for Healthcare:   [x]English       []other:  Has the plan of care been signed (Y/N):        []  Yes  [x]  No       Is this a Progress Report:     [x]  Yes  []  No      If Yes:  Date Range for reporting period:  Beginning  20  Ending 10/20/20    Progress report will be due (10 Rx or 30 days whichever is less):        Recertification will be due (POC Duration  / 90 days whichever is less):4 weeks       Visit # Insurance Allowable Auth Required   4 ? []  Yes []  No        SUBJECTIVE:  Pt. Reports that he is doing very well with exercises and is not having any issues. He is not working where he has to perform lifting overhead now, due to only a summer job, however, feels like he would be able to do this without issue. He has also been able to progress back to performing a regular push up without issue.     OBJECTIVE:     Initial Initial Current   VAS 10   0/10    QDash 34%   9%    ROM Left Right     Shoulder Flex 126 166 168   Shoulder Abd 109 165 165   Shoulder ER T 4 T 5  T6   Shoulder IR T 11 T 5  T5   Strength  Left Right     Shoulder Flex 4 5 5 Shoulder Scap 4 5 5   Shoulder ER 4+ 5 5   Shoulder IR 5* 5 5           Observation:pt. With good scapulo-humeral rhythm with flexion, abduction and scaption. Still a little winging noted with return.  Min stiffness L shoulder at start, added mobs and no issues   Palpation: (-)       RESTRICTIONS/PRECAUTIONS:     Exercises/Interventions:   Therapeutic Ex Sets/reps Notes   tableslide flexion, ladder, 45's  With focus on scap movement   SBS     UT/LEV stretch          TB rows, ext L  Blue band   TB ER IR (with towel roll)   mod cues for scapular position Blue band   Corner stretch HEP Per doc   Pt ed:   PT progression, 8 min              Supine ceiling punch HEP Add weight as able for HEP +supine approximation   Prone scap retraction     SL scap retraction with ER               scap clocks at wall with TB x20  black   Ball on wall with u/d,s/s,cw/ccw  Cues for scap position   Serratus slide with band at wall x20 black        Manual Intervention     Assessment of L shoulder     Gr I-II post/inf GHJ mobs 5'    PROM L shoulder          Ed for CFM pec mnor/IS     GHJ gr I-II oscillations               NMR re-education     Door way stretch :20x3    Supine approximation dnd:60x2 ea ball up/ball down 2 kg db   Supine SA to lat arm bar dndx20 2 kg db        Prone plank Review for HEP:30x1 Cues for protraction   Table plank     Wall push up vs SB wall push up Ed for progression Cues for SA press at end        CC row/ext/LPD/ER/IR HEPx10 ea 10pl/8pl/8pl/3pl/3pl   B ER with TB elbows on 1/2 wall x20  OVL   PNF D1/D2 L shoulder F with TB x30 ea Supine blue TB   Lift with MB R/L x15 ea 12#            Therapeutic Exercise and NMR EXR  [x] (75784) Provided verbal/tactile cueing for activities related to strengthening, flexibility, endurance, ROM  for improvements in scapular, scapulothoracic and UE control with self care, reaching, carrying, lifting, house/yardwork, driving/computer work.     [x] (85468) Provided face-to-face)  [] EVAL (HIGH) 00821 (typically 45 minutes face-to-face)  [] RE-EVAL     [x] OM(26609) x  2   [] IONTO  [x] NMR (59654) x 1    [] VASO  [] Manual (06838) x     [] Other:  [] TA x      [] Mech Traction (50422)  [] ES(attended) (89551)      [] ES (un) (22629):     GOALS:  Patient stated goal: restore ROM and strength L shoulder  [] Progressing: [x] Met: [] Not Met: [] Adjusted    Therapist goals for Patient:   Short Term Goals: To be achieved in: 2 weeks  1. Independent in HEP and progression per patient tolerance, in order to prevent re-injury. [] Progressing: [x] Met: [] Not Met: [] Adjusted  2. Patient will have a decrease in pain to facilitate improvement in movement, function, and ADLs as indicated by Functional Deficits. [] Progressing: [x] Met: [] Not Met: [] Adjusted      Long Term Goals: To be achieved in: 8 weeks  1. Disability index score of 17% or less for the Quickdash  to assist with reaching prior level of function. [] Progressing: [x] Met: [] Not Met: [] Adjusted  2. Patient will demonstrate increased AROM to WNL to allow for proper joint functioning as indicated by patients Functional Deficits. [] Progressing: [x] Met: [] Not Met: [] Adjusted  3. Patient will demonstrate an increase in Strength >/=5/5 to allow for proper functional mobility as indicated by patients Functional Deficits. [] Progressing: [x] Met: [] Not Met: [] Adjusted  4. Patient will return to reaching and lifting functional activities without increased symptoms or restriction. [] Progressing:has still been limiting activity [x] Met: [] Not Met: [] Adjusted  5. Pt to be able to return to work with lifting(patient specific functional goal)    [] Progressing: [x] Met: [] Not Met: [] Adjusted  6. Pt.  To demonstrate AROM L shoulder with appropriate scapulo-humeral rhythm    [] Progressing: [x] Met: [] Not Met: [] Adjusted    Overall Progression Towards Functional goals/ Treatment Progress Update:  [x] Patient is progressing as expected towards functional goals listed. [] Progression is slowed due to complexities/Impairments listed. [] Progression has been slowed due to co-morbidities. [] Plan just implemented, too soon to assess goals progression <30days   [] Goals require adjustment due to lack of progress  [] Patient is not progressing as expected and requires additional follow up with physician  [] Other    Prognosis for POC: [x] Good [] Fair  [] Poor      Patient requires continued skilled intervention: [x] Yes  [] No      ASSESSMENT:  Pt. Doing well and progressing well with HEP. He has met all goals of PT and is pain free and doing all functional activities. Treatment/Activity Tolerance:  [x] Patient tolerated treatment well [] Patient limited by fatique  [] Patient limited by pain  [] Patient limited by other medical complications  [] Other:     Prognosis: [x] Good [] Fair  [] Poor    Patient Requires Follow-up: [x] Yes  [] No    PLAN:  DC to HEP, written HEP updated    [x] Continue per plan of care [] Alter current plan (see comments above)  [] Plan of care initiated [] Hold pending MD visit [x] Discharge      Electronically signed by:  Maria Guadalupe Burns, PT, MSPT, OMT-C [id number]    Note: If patient does not return for scheduled/ recommended follow up visits, this note will serve as a discharge from care along with most recent update on progress.

## 2023-02-04 ENCOUNTER — HOSPITAL ENCOUNTER (EMERGENCY)
Age: 23
Discharge: HOME | End: 2023-02-04
Payer: COMMERCIAL

## 2023-02-04 VITALS
RESPIRATION RATE: 16 BRPM | HEIGHT: 75 IN | OXYGEN SATURATION: 99 % | BODY MASS INDEX: 26.11 KG/M2 | DIASTOLIC BLOOD PRESSURE: 78 MMHG | WEIGHT: 210 LBS | SYSTOLIC BLOOD PRESSURE: 132 MMHG | TEMPERATURE: 97.9 F | HEART RATE: 73 BPM
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
## 2023-02-04 PROCEDURE — 96372 THER/PROPH/DIAG INJ SC/IM: CPT

## 2023-02-04 PROCEDURE — 6370000000 HC RX 637 (ALT 250 FOR IP): Performed by: NURSE PRACTITIONER

## 2023-02-04 PROCEDURE — 90675 RABIES VACCINE IM: CPT | Performed by: NURSE PRACTITIONER

## 2023-02-04 PROCEDURE — 90375 RABIES IG IM/SC: CPT | Performed by: NURSE PRACTITIONER

## 2023-02-04 PROCEDURE — 99284 EMERGENCY DEPT VISIT MOD MDM: CPT

## 2023-02-04 PROCEDURE — 90472 IMMUNIZATION ADMIN EACH ADD: CPT | Performed by: NURSE PRACTITIONER

## 2023-02-04 PROCEDURE — 90471 IMMUNIZATION ADMIN: CPT | Performed by: NURSE PRACTITIONER

## 2023-02-04 PROCEDURE — 6360000002 HC RX W HCPCS: Performed by: NURSE PRACTITIONER

## 2023-02-04 PROCEDURE — 90715 TDAP VACCINE 7 YRS/> IM: CPT | Performed by: NURSE PRACTITIONER

## 2023-02-04 RX ORDER — BUSPIRONE HYDROCHLORIDE 10 MG/1
10 TABLET ORAL 2 TIMES DAILY
COMMUNITY
Start: 2023-01-27

## 2023-02-04 RX ORDER — AMOXICILLIN AND CLAVULANATE POTASSIUM 875; 125 MG/1; MG/1
1 TABLET, FILM COATED ORAL ONCE
Status: COMPLETED | OUTPATIENT
Start: 2023-02-04 | End: 2023-02-04

## 2023-02-04 RX ORDER — AMOXICILLIN AND CLAVULANATE POTASSIUM 875; 125 MG/1; MG/1
1 TABLET, FILM COATED ORAL 2 TIMES DAILY
Qty: 20 TABLET | Refills: 0 | Status: SHIPPED | OUTPATIENT
Start: 2023-02-04 | End: 2023-02-14

## 2023-02-04 RX ADMIN — TETANUS TOXOID, REDUCED DIPHTHERIA TOXOID AND ACELLULAR PERTUSSIS VACCINE, ADSORBED 0.5 ML: 5; 2.5; 8; 8; 2.5 SUSPENSION INTRAMUSCULAR at 13:04

## 2023-02-04 RX ADMIN — AMOXICILLIN AND CLAVULANATE POTASSIUM 1 TABLET: 875; 125 TABLET, FILM COATED ORAL at 12:31

## 2023-02-04 RX ADMIN — RABIES IMMUNE GLOBULIN (HUMAN) 1905 UNITS: 300 INJECTION, SOLUTION INFILTRATION; INTRAMUSCULAR at 12:31

## 2023-02-04 RX ADMIN — RABIES VACCINE 1 ML: KIT at 12:07

## 2023-02-04 ASSESSMENT — PAIN DESCRIPTION - LOCATION: LOCATION: HAND

## 2023-02-04 ASSESSMENT — PAIN SCALES - GENERAL: PAINLEVEL_OUTOF10: 2

## 2023-02-04 ASSESSMENT — PAIN - FUNCTIONAL ASSESSMENT: PAIN_FUNCTIONAL_ASSESSMENT: 0-10

## 2023-02-04 ASSESSMENT — PAIN DESCRIPTION - ORIENTATION: ORIENTATION: LEFT

## 2023-02-04 NOTE — ED PROVIDER NOTES
[facility]  ED  Emergency Department Encounter    Patient Name: Iftikhar Dupree  MRN: [id number]  YOB: 2000  Date of Evaluation: 2/4/2023  Provider: No primary care provider on file. Note Started: 11:20 AM EST 2/4/23    CHIEF COMPLAINT  Animal Bite (To left hand by a dog with vaccinations that were not up to date)    [facility]  Patient evaluated in conjunction with Bentley Simon PA-C      HISTORY OF PRESENT ILLNESS  Iftikhar Dupree is a 20 y.o. male who presents to the ED for dog bite to left hand. Patient states he was at work where he is a  and was helping to hold a dog when it reached out and bit his hand and immediately let go. Patient states that the dog was not up to date on vaccinations and is unsure about it being quarantined. Patient states he immediately cleaned the wound with alcohol and hydrogen peroxide. He states he has not had his rabies vaccine. He reports overall hand soreness. He denies numbness, tingling, or loss of sensation. No active bleeding or drainage at this time. He denies any other complaints. No other complaints, modifying factors or associated symptoms. Nursing notes reviewed were all reviewed and agreed with or any disagreements were addressed in the HPI. PMH:  History reviewed. No pertinent past medical history. Surgical History:  History reviewed. No pertinent surgical history. Family History:  History reviewed. No pertinent family history.   Social History:  Social History     Socioeconomic History    Marital status: Single     Spouse name: Not on file    Number of children: Not on file    Years of education: Not on file    Highest education level: Not on file   Occupational History    Not on file   Tobacco Use    Smoking status: Never    Smokeless tobacco: Never   Substance and Sexual Activity    Alcohol use: Not Currently    Drug use: Not Currently    Sexual activity: Not on file   Other Topics Concern    Not on file   Social History Narrative    Not on file     Social Determinants of Health     Financial Resource Strain: Not on file   Food Insecurity: Not on file   Transportation Needs: Not on file   Physical Activity: Not on file   Stress: Not on file   Social Connections: Not on file   Intimate Partner Violence: Not on file   Housing Stability: Not on file     Current Medications:  No current facility-administered medications for this encounter. Current Outpatient Medications   Medication Sig Dispense Refill    amoxicillin-clavulanate (AUGMENTIN) 875-125 MG per tablet Take 1 tablet by mouth 2 times daily for 10 days 20 tablet 0    busPIRone (BUSPAR) 10 MG tablet Take 10 mg by mouth 2 times daily      ibuprofen (ADVIL;MOTRIN) 200 MG tablet Take 200 mg by mouth every 6 hours as needed for Pain (Patient not taking: Reported on 2/4/2023)       Allergies:  No Known Allergies  Screenings:     Tod Coma Scale  Eye Opening: Spontaneous  Best Verbal Response: Oriented  Best Motor Response: Obeys commands  Gibson Coma Scale Score: 15           CIWA Assessment  BP: 132/78  Heart Rate: 73          REVIEW OF SYSTEMS  Positives and Pertinent Negatives as per HPI. PHYSICAL EXAM  /78   Pulse 73   Temp 97.9 °F (36.6 °C) (Oral)   Resp 16   Ht 6' 3\" (1.905 m)   Wt 210 lb (95.3 kg)   SpO2 99%   BMI 26.25 kg/m²     GENERAL APPEARANCE: Awake and alert. Cooperative. No acute distress. HEAD: Normocephalic. Atraumatic. EYES:  EOM's grossly intact. ENT: Mucous membranes are pink and moist.   NECK: Supple. No JVD. HEART: RRR. No murmurs, rubs, or gallops. LUNGS: CTA B. Respirations unlabored. Good air exchange. ABDOMEN: Soft. Non-distended. Non-tender. EXTREMITIES: No peripheral edema. Moves all extremities equally. All extremities neurovascularly intact. Multiple superficial wounds to dorsal aspect of left hand overlying second metacarpal and palmar surface. Compartments soft. Pulses intact. No active bleeding. No foreign body noted. SKIN: Warm and dry. No acute rashes. NEUROLOGICAL: Alert and oriented. No gross facial drooping. Strength 5/5, sensation intact. No numbness or tingling. Left hand finger thumb opposition intact. And that I can just sign this    EKG  When ordered, EKG's are interpreted by the ED Physician in the absence of a Cardiologist.  Please see their note for interpretation of EKG. LABS  Labs Reviewed - No data to display  When ordered, only abnormal lab results are displayed. All other labs were within normal range or not returned as of this dictation. RADIOLOGY  Non-plain film images such as CT, U/S, and MRI are read by the radiologist.  Plain radiographic images are visualized and preliminarily interpreted by the ED Provider with the below findings:       Interpretation per the Radiologist below, if available at the time of this note:  No orders to display     PROCEDURES  Unless otherwise noted below, none. ED COURSE/DDx/MDM  History obtained from:  Patient    Vitals:  Vitals:    02/04/23 1104   BP: 132/78   Pulse: 73   Resp: 16   Temp: 97.9 °F (36.6 °C)   TempSrc: Oral   SpO2: 99%   Weight: 210 lb (95.3 kg)   Height: 6' 3\" (1.905 m)     Patient received following medications in ED:  Medications   tetanus-diphth-acell pertussis (BOOSTRIX) injection 0.5 mL (0.5 mLs IntraMUSCular Given 2/4/23 1304)   rabies immune globulin (HYPERRAB) 1500 UNIT/5ML injection 1,905 Units (1,905 Units IntraMUSCular Given 2/4/23 1231)   amoxicillin-clavulanate (AUGMENTIN) 875-125 MG per tablet 1 tablet (1 tablet Oral Given 2/4/23 1231)   rabies vaccine, PCEC (RABAVERT) injection 1 mL (1 mL IntraMUSCular Given 2/4/23 1207)     Sepsis Consideration:  Is this patient to be included in the SEP-1 Core Measure due to severe sepsis or septic shock? No   Exclusion criteria - the patient is NOT to be included for SEP-1 Core Measure due to: Infection is not suspected    Records Reviewed:   None relevant.     Chronic conditions affecting care:   None.   has no past medical history on file. Social Determinants:   None    Consults:   None    Reassessment:      On reassessment patient continues to have no significant complaints. MDM/Disposition Considerations:   Briefly Sherry Palomino presents for dog bite to left hand while at work. He immediately cleaned wound at work and presented to the ER for vaccinations and evaluation. We discussed possibility of imaging however through shared decision making, decided against XR imaging. Wound was cleansed, vaccinations administered, and first dose of antibiotics given. I personally infiltrated wounds to left hand and administered remaining immunoglobulin to left deltoid. Recommendations and concerns for return to ER as well as follow discussed with patient. Discussed return visit to [facility] for subsequent rabies vaccination. Patient agreeable and felt stable for discharge home. Critical Care Time:   0 Minutes of critical care time spent not including separately billable procedures. DDx:  I estimate there is LOW risk for CELLULITIS, COMPARTMENT SYNDROME, NECROTIZING FASCIITIS, TENDON OR NEUROVASCULAR INJURY, or FOREIGN BODY, thus I consider the discharge disposition reasonable. Also, there is no evidence or peritonitis, sepsis, or toxicity. Sherry Palomino and I have also discussed returning to the Emergency Department immediately if new or worsening symptoms occur. We have discussed the symptoms which are most concerning (e.g., changing or worsening pain, fever, numbness, weakness, cool or painful digits) that necessitate immediate return. FINAL IMPRESSION  1. Dog bite, initial encounter      Patient referred [place]  [address] [place] [address] [address] [phone]    Schedule an appointment as soon as possible for a visit in 2 days  For wound re-check    [facility]  ED  [address] [facility] [address]  [phone]    If symptoms worsen    Discharge Medications:   Discharge Medication List as of 2/4/2023  1:00 PM        START taking these medications    Details   amoxicillin-clavulanate (AUGMENTIN) 875-125 MG per tablet Take 1 tablet by mouth 2 times daily for 10 days, Disp-20 tablet, R-0Normal           Discontinued Medications:  Discharge Medication List as of 2/4/2023  1:00 PM        Risk management discussed and shared decision making had with patient and/or surrogate. All questions were answered. Patient will follow up with  PCP in 2-3 days for wound check for further evaluation/treatment. All questions answered. Patient will return to ED for new/worsening symptoms. DISPOSITION:  Patient was discharged home in stable condition. (Please note that portions of this note were completed with a voice recognition program.  Efforts were made to edit the dictations but occasionally words are mis-transcribed).           Tegan Coughlin, FRANCISCO - CNP  02/04/23 1326
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
## 2023-02-07 DIAGNOSIS — S61.451S DOG BITE OF RIGHT HAND, SEQUELA: ICD-10-CM

## 2023-02-07 PROBLEM — S61.459A DOG BITE OF HAND: Status: ACTIVE | Noted: 2023-02-07
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
## 2023-02-08 ENCOUNTER — HOSPITAL ENCOUNTER (OUTPATIENT)
Dept: NURSING | Age: 23
Setting detail: INFUSION SERIES
Discharge: HOME | End: 2023-02-08
Payer: COMMERCIAL

## 2023-02-08 VITALS
HEART RATE: 59 BPM | BODY MASS INDEX: 26.11 KG/M2 | WEIGHT: 210 LBS | TEMPERATURE: 97.1 F | RESPIRATION RATE: 16 BRPM | OXYGEN SATURATION: 100 % | SYSTOLIC BLOOD PRESSURE: 137 MMHG | HEIGHT: 75 IN | DIASTOLIC BLOOD PRESSURE: 74 MMHG

## 2023-02-08 DIAGNOSIS — S61.451S DOG BITE OF RIGHT HAND, SEQUELA: Primary | ICD-10-CM
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
## 2023-02-08 PROCEDURE — 99211 OFF/OP EST MAY X REQ PHY/QHP: CPT

## 2023-02-08 PROCEDURE — 90471 IMMUNIZATION ADMIN: CPT | Performed by: NURSE PRACTITIONER

## 2023-02-08 PROCEDURE — 6360000002 HC RX W HCPCS: Performed by: NURSE PRACTITIONER

## 2023-02-08 PROCEDURE — 90675 RABIES VACCINE IM: CPT | Performed by: NURSE PRACTITIONER

## 2023-02-08 RX ADMIN — RABIES VACCINE 1 ML: KIT at 07:28

## 2023-02-08 ASSESSMENT — PAIN - FUNCTIONAL ASSESSMENT: PAIN_FUNCTIONAL_ASSESSMENT: NONE - DENIES PAIN

## 2023-02-08 NOTE — PROGRESS NOTES
Pt tolerates injection well. Refuses discharge instructions given in emergency room.  Discharged with follow up scheduled in stable condition

## 2023-02-14 ENCOUNTER — HOSPITAL ENCOUNTER (OUTPATIENT)
Dept: NURSING | Age: 23
Setting detail: INFUSION SERIES
Discharge: HOME | End: 2023-02-14
Payer: COMMERCIAL

## 2023-02-14 VITALS
BODY MASS INDEX: 26.11 KG/M2 | HEART RATE: 61 BPM | TEMPERATURE: 96.9 F | RESPIRATION RATE: 16 BRPM | OXYGEN SATURATION: 100 % | HEIGHT: 75 IN | DIASTOLIC BLOOD PRESSURE: 71 MMHG | WEIGHT: 210 LBS | SYSTOLIC BLOOD PRESSURE: 125 MMHG

## 2023-02-14 DIAGNOSIS — S61.451S DOG BITE OF RIGHT HAND, SEQUELA: Primary | ICD-10-CM
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
## 2023-02-14 PROCEDURE — 90471 IMMUNIZATION ADMIN: CPT | Performed by: NURSE PRACTITIONER

## 2023-02-14 PROCEDURE — 90675 RABIES VACCINE IM: CPT | Performed by: NURSE PRACTITIONER

## 2023-02-14 PROCEDURE — 99211 OFF/OP EST MAY X REQ PHY/QHP: CPT

## 2023-02-14 PROCEDURE — 6360000002 HC RX W HCPCS: Performed by: NURSE PRACTITIONER

## 2023-02-14 RX ADMIN — RABIES VACCINE 1 ML: KIT at 08:21

## 2023-02-14 ASSESSMENT — PAIN - FUNCTIONAL ASSESSMENT: PAIN_FUNCTIONAL_ASSESSMENT: NONE - DENIES PAIN

## 2023-02-14 NOTE — PROGRESS NOTES
Pt tolerates injection well. Dog bite to left hand healed no redness no swelling denies any pain 2 pin point scabbed areas noted.  Discharged to home with follow up appointment in 1 week

## 2023-02-21 ENCOUNTER — HOSPITAL ENCOUNTER (OUTPATIENT)
Dept: NURSING | Age: 23
Setting detail: INFUSION SERIES
Discharge: HOME | End: 2023-02-21
Payer: COMMERCIAL

## 2023-02-21 VITALS
TEMPERATURE: 97.4 F | WEIGHT: 210 LBS | HEART RATE: 57 BPM | SYSTOLIC BLOOD PRESSURE: 135 MMHG | HEIGHT: 75 IN | OXYGEN SATURATION: 100 % | DIASTOLIC BLOOD PRESSURE: 78 MMHG | RESPIRATION RATE: 16 BRPM | BODY MASS INDEX: 26.11 KG/M2
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
## 2023-02-21 DIAGNOSIS — S61.451S DOG BITE OF RIGHT HAND, SEQUELA: Primary | ICD-10-CM

## 2023-02-21 PROCEDURE — 90675 RABIES VACCINE IM: CPT | Performed by: NURSE PRACTITIONER

## 2023-02-21 PROCEDURE — 90471 IMMUNIZATION ADMIN: CPT | Performed by: NURSE PRACTITIONER

## 2023-02-21 PROCEDURE — 6360000002 HC RX W HCPCS: Performed by: NURSE PRACTITIONER

## 2023-02-21 PROCEDURE — 99211 OFF/OP EST MAY X REQ PHY/QHP: CPT

## 2023-02-21 RX ADMIN — RABIES VACCINE 1 ML: KIT at 08:14

## 2023-02-21 ASSESSMENT — PAIN - FUNCTIONAL ASSESSMENT: PAIN_FUNCTIONAL_ASSESSMENT: NONE - DENIES PAIN

## 2023-02-21 NOTE — PROGRESS NOTES
Pt tolerates injection well. Copy of rabies vaccine record given to patient.  Discharged to home in stable condition